# Patient Record
Sex: FEMALE | Race: WHITE | NOT HISPANIC OR LATINO | Employment: UNEMPLOYED | URBAN - NONMETROPOLITAN AREA
[De-identification: names, ages, dates, MRNs, and addresses within clinical notes are randomized per-mention and may not be internally consistent; named-entity substitution may affect disease eponyms.]

---

## 2017-08-18 ENCOUNTER — CONSULT (OUTPATIENT)
Dept: ONCOLOGY | Facility: CLINIC | Age: 27
End: 2017-08-18

## 2017-08-18 VITALS
WEIGHT: 103 LBS | HEIGHT: 61 IN | DIASTOLIC BLOOD PRESSURE: 57 MMHG | TEMPERATURE: 98.3 F | RESPIRATION RATE: 16 BRPM | HEART RATE: 98 BPM | SYSTOLIC BLOOD PRESSURE: 93 MMHG | BODY MASS INDEX: 19.45 KG/M2

## 2017-08-18 DIAGNOSIS — C50.811 MALIGNANT NEOPLASM OF OVERLAPPING SITES OF RIGHT FEMALE BREAST (HCC): Primary | ICD-10-CM

## 2017-08-18 PROBLEM — C50.819 OVERLAPPING MALIGNANT NEOPLASM OF FEMALE BREAST (HCC): Status: ACTIVE | Noted: 2017-08-18

## 2017-08-18 PROCEDURE — 99245 OFF/OP CONSLTJ NEW/EST HI 55: CPT | Performed by: INTERNAL MEDICINE

## 2017-08-18 RX ORDER — MORPHINE SULFATE 15 MG/1
1 TABLET ORAL
COMMUNITY
Start: 2017-07-28

## 2017-08-18 RX ORDER — DIPHENOXYLATE HYDROCHLORIDE AND ATROPINE SULFATE 2.5; .025 MG/1; MG/1
TABLET ORAL
Refills: 0 | COMMUNITY
Start: 2017-08-07

## 2017-08-18 RX ORDER — CYPROHEPTADINE HYDROCHLORIDE 4 MG/1
TABLET ORAL
COMMUNITY
Start: 2017-08-17

## 2017-08-18 RX ORDER — PROMETHAZINE HYDROCHLORIDE 12.5 MG/1
SUPPOSITORY RECTAL
COMMUNITY
Start: 2017-08-08

## 2017-08-18 RX ORDER — ONDANSETRON 4 MG/1
4 TABLET, FILM COATED ORAL EVERY 8 HOURS PRN
COMMUNITY

## 2017-08-18 RX ORDER — LORAZEPAM 0.5 MG/1
0.5 TABLET ORAL EVERY 8 HOURS PRN
COMMUNITY
End: 2017-10-17

## 2017-08-18 RX ORDER — OXYCODONE HCL 20 MG/1
TABLET, FILM COATED, EXTENDED RELEASE ORAL
COMMUNITY
Start: 2017-08-08 | End: 2017-11-07 | Stop reason: SDUPTHER

## 2017-08-18 RX ORDER — PROMETHAZINE HYDROCHLORIDE 12.5 MG/1
TABLET ORAL
COMMUNITY
Start: 2017-08-08

## 2017-08-18 RX ORDER — LEVOCETIRIZINE DIHYDROCHLORIDE 5 MG/1
TABLET, FILM COATED ORAL
COMMUNITY
Start: 2017-08-17

## 2017-08-18 RX ORDER — BUSPIRONE HYDROCHLORIDE 5 MG/1
TABLET ORAL
COMMUNITY
Start: 2017-07-07

## 2017-08-18 RX ORDER — LEVOFLOXACIN 500 MG/1
TABLET, FILM COATED ORAL
COMMUNITY
Start: 2017-08-17 | End: 2017-10-17

## 2017-08-18 RX ORDER — FENTANYL 25 UG/H
PATCH TRANSDERMAL
COMMUNITY
Start: 2017-07-27 | End: 2017-08-18

## 2017-08-18 NOTE — PROGRESS NOTES
CHIEF COMPLAINT: Breast cancer    REASON FOR REFERRAL: Breast cancer      RECORDS OBTAINED  Records of the patients history including those obtained from  Dr. Aparicio were reviewed and summarized in detail.      HISTORY OF PRESENT ILLNESS:  The patient is a 26 y.o.  female, referred for breast cancer.  She presented with a painful lump in her breast in November.  She has a strong family history all breast next panel was negative.  In January 2017 breast imaging showed a 2.7 cm mass at the 10 o'clock position in the right breast.  Ultrasound showed 3 positive axillary nodes with moderately differentiated infiltrating lobular carcinoma triple positive by fish 90% ER and NH positive and HER-2/susanne ratio of 2.15.  This was a T2 N1 M1 stage IV breast cancer at diagnosis with radiographic involvement of the right femur, acetabulum, lesser trochanter, and several hypodense liver lesions on initial CT.  She was started on THP March 2017 with Dr. Porter and had intramedullary nailing with Dr. Rojas April 2017 and was also diagnosed with a pulmonary embolus that month.  She then received radiation to her hip and subsequently has had radiation to her back and sternum with Dr. Aparicio.  MRI of her brain was negative.  Due to Xgeva she has developed hypocalcemia.  Despite 5 courses of THP, he had progression in her bones on recent CTs including bone scan and hence had the radiation and [place] and now has started second line therapies with a HER-2 targeted therapy that they're not sure what the drug name was as well as Lupron.    REVIEW OF SYSTEMS:  A 14 point review of systems was performed and is negative except as noted above.    No past medical history on file.  Past Surgical History:   Procedure Laterality Date   • LAPAROSCOPIC TUBAL LIGATION  2011       No current outpatient prescriptions on file prior to visit.     No current facility-administered medications on file prior to visit.        No Known  "Allergies    Social History     Social History   • Marital status: Unknown     Spouse name: N/A   • Number of children: N/A   • Years of education: N/A     Social History Main Topics   • Smoking status: Never Smoker   • Smokeless tobacco: None   • Alcohol use None   • Drug use: None   • Sexual activity: Not Asked     Other Topics Concern   • None     Social History Narrative   • None       Family History   Problem Relation Age of Onset   • Breast cancer Paternal Grandmother        PHYSICAL EXAM:    BP 93/57  Pulse 98  Temp 98.3 °F (36.8 °C)  Resp 16  Ht 61\" (154.9 cm)  Wt 103 lb (46.7 kg)  BMI 19.46 kg/m2    ECOG: (2) Ambulatory and capable of self care, unable to carry out work activity, up and about > 50% or waking hours  General: well appearing female in no acute distress  HEENT: sclera anicteric, oropharynx clear  Lymphatics: no cervical, supraclavicular, inguinal, or axillary adenopathy  Cardiovascular: regular rate and rhythm, no murmurs  Neck: Supple; No thyromegaly  Lungs: clear to auscultation bilaterally. No respiratory distress.   Abdomen: soft, nontender, nondistended.  No palpable organomegaly  Extremities: no cyanosis, clubbing, edema, or cords  Skin: no rashes, lesions, bruising, or petechiae  Neuro: Alert and oriented x 4; Moving all extremities.  Psych: No anxiety or depression    No results found for any previous visit.    Assessment/Plan     1. Stage IV T2 N1 M1 breast cancer metastatic to bone and possibly liver: 1 hour discussion regarding her tragic predicament.  I recommended to her the care of Dr. Jones.  I'm sure that they will be adding either Arimidex or tamoxifen though I suspect tamoxifen is unlikely to be the choice given her pulmonary embolus history.  Has had a hysterectomy at age 23.  Endometriosis but her ovaries are still intact.  What second line HER-2/susanne directed therapy is chosen is up for debate in this situation and I would not likely manage the choice " but there is more than one option and no absolute right answer.  I would not use samarium or other systemic radioisotopes for bone pain purposes as it is likely to cause protracted pancytopenia as that would make subsequent other therapies difficult I also reiterated to her the palliative nature of anything more 1 to do and the fact that one day will, she will likely need to put her foot down and due for further treatment if she feels the treatments are ineffective or too difficult for her.  Presently she is very weak and frail but not too frail for therapy and she is going to soldier on and I commended to her care she is receiving and she will return to the care.  I did go over the fact that we have clinical trials available for breast cancer but most of them are likely available at the [facility] as well.  I specifically do not have the checkpoint inhibitor trials open yet at our facility that may be coming available.  Unfortunately, in the breast cancer research world there have been pressure few molecular targets.  I also told her that generally with HER-2/susanne directed therapy we would not add Ibrance or everolimus to the hormone directed therapies as there are toxic and less effective in those settings.      Nicolas Hughes MD    8/18/2017

## 2017-10-17 ENCOUNTER — LAB (OUTPATIENT)
Dept: LAB | Facility: HOSPITAL | Age: 27
End: 2017-10-17

## 2017-10-17 ENCOUNTER — OFFICE VISIT (OUTPATIENT)
Dept: PALLIATIVE CARE | Facility: CLINIC | Age: 27
End: 2017-10-17

## 2017-10-17 VITALS
SYSTOLIC BLOOD PRESSURE: 111 MMHG | WEIGHT: 120 LBS | BODY MASS INDEX: 22.67 KG/M2 | DIASTOLIC BLOOD PRESSURE: 62 MMHG | HEART RATE: 90 BPM

## 2017-10-17 DIAGNOSIS — G47.01 INSOMNIA SECONDARY TO CHRONIC PAIN: ICD-10-CM

## 2017-10-17 DIAGNOSIS — G89.3 PAIN FROM BONE METASTASES (HCC): ICD-10-CM

## 2017-10-17 DIAGNOSIS — F32.9 REACTIVE DEPRESSION: ICD-10-CM

## 2017-10-17 DIAGNOSIS — R53.0 NEOPLASTIC MALIGNANT RELATED FATIGUE: ICD-10-CM

## 2017-10-17 DIAGNOSIS — K59.03 CONSTIPATION DUE TO OPIOID THERAPY: ICD-10-CM

## 2017-10-17 DIAGNOSIS — G89.29 INSOMNIA SECONDARY TO CHRONIC PAIN: ICD-10-CM

## 2017-10-17 DIAGNOSIS — R41.841 COGNITIVE COMMUNICATION DEFICIT: Primary | ICD-10-CM

## 2017-10-17 DIAGNOSIS — Z51.81 THERAPEUTIC DRUG MONITORING: ICD-10-CM

## 2017-10-17 DIAGNOSIS — T40.2X5A CONSTIPATION DUE TO OPIOID THERAPY: ICD-10-CM

## 2017-10-17 DIAGNOSIS — Z51.81 THERAPEUTIC DRUG MONITORING: Primary | ICD-10-CM

## 2017-10-17 DIAGNOSIS — C79.51 PAIN FROM BONE METASTASES (HCC): ICD-10-CM

## 2017-10-17 PROBLEM — R53.83 FATIGUE: Status: ACTIVE | Noted: 2017-10-17

## 2017-10-17 PROBLEM — Q61.5 CONGENITAL MEDULLARY SPONGE KIDNEY: Status: ACTIVE | Noted: 2017-10-17

## 2017-10-17 LAB
AMPHET+METHAMPHET UR QL: NEGATIVE
AMPHETAMINES UR QL: NEGATIVE
BARBITURATES UR QL SCN: NEGATIVE
BENZODIAZ UR QL SCN: NEGATIVE
BUPRENORPHINE SERPL-MCNC: NEGATIVE NG/ML
CANNABINOIDS SERPL QL: NEGATIVE
COCAINE UR QL: NEGATIVE
METHADONE UR QL SCN: NEGATIVE
OPIATES UR QL: POSITIVE
OXYCODONE UR QL SCN: POSITIVE
PCP UR QL SCN: NEGATIVE
PROPOXYPH UR QL: NEGATIVE
TRICYCLICS UR QL SCN: NEGATIVE

## 2017-10-17 PROCEDURE — 80306 DRUG TEST PRSMV INSTRMNT: CPT | Performed by: INTERNAL MEDICINE

## 2017-10-17 PROCEDURE — 99205 OFFICE O/P NEW HI 60 MIN: CPT | Performed by: INTERNAL MEDICINE

## 2017-10-17 RX ORDER — MIRTAZAPINE 15 MG/1
15 TABLET, FILM COATED ORAL NIGHTLY
Qty: 30 TABLET | Refills: 3 | Status: SHIPPED | OUTPATIENT
Start: 2017-10-17

## 2017-10-17 NOTE — PROGRESS NOTES
Subjective   Nessa Hammer is a 26 y.o. female.     History of Present Illness   26yowf with breast cancer diagnosed 1/2017 after painful lump R breast 11/2016.  Diagnosed with ER/NM positive T2 N1 M1 stage IV breast cancer metastatic to bones - R femur, acetabulum, lesser trochanter, and liver lesions.  S/p chemotherapy Dr. Porter and intramedullary nailing (Dr. Rojas) 4/2017.  S/p radiation to R hip.  Xgeva.  Has had progression despite treatment.  Referred by Dr. Porter for symptom management.  Co-morbid PE 4/2017 on Lovenox.    Noted intolerance to XRT in past with diarrhea and vomiting. eGFR >60 as of 9/20/17, Cr. 0.5.  Has medullary sponge kidney disease, so NSAIDs not recommended.      Pain: Continuous stabbing pain of R breast upon diagnosis.  None currently.  Past 2 weeks with continuous stabbing pain of L breast.  Feels breast is hot to touch.  No nipple discharge.  No discoloration.  Bilateral legs and low thoracic spine continuous stabbing, throbbing ache.  Also feels sensation of pulling calf muscles that radiates posterior thigh.  Back and leg pains worse with activity such a s cleaning house and making beds.  Better sometimes with warm bath    Medication management:  H/o somnolence with Fentanyl 25mcg and 12mcg.  H/o MSSR  30mg q12h with MSIR 15m PRN and mother though pain was best controlled then, but had urinary retention.  Rotated to Fentanyl (somnolence).  Start Oxycontin 20mg but c/o daytime sleepiness, so she only takes at bedtime.  She reports taking MSIR only up to once daily for pain.  Opioids locked in gun cabinet.  She keeps daily opioid on her in child safe pill bottle.    Symptoms: Constipation, BM ever other day with straining despite Senna.  Cognitive decline since IM nailing and initiation of opioid therapy.  Poor short term memory, stuttering and expressive difficulties with word finding.  No longer drives.  Other adult family members around her continuously between in-laws  who live with her and her parents.  Poor sleep (about 5 hours) with episodes of night awakening due to pain.  Sleep also impacted by  awakening at 4 am to go to work.  Overall fatigue.  Depression     Function/Activities: Independent of ADLs.  Requires transportation due to cognitive changes.  She walked 1 mile this past weekend for jumana event.      Mood/Support/Distress: Large support system.  .  Elementary school age children.  Pt and her parents seen together with LCSW today.    ACP/Goals: Pain control and function.  Did not address advance care planning at initial appointment.  Noted patient is .  Her  was not present today.      The following portions of the patient's history were reviewed and updated as appropriate: allergies, current medications, past family history, past medical history, past social history, past surgical history and problem list.    Review of Systems  Otherwise negative except as below and as already detailed in HPI.    MICHI:  Reviewed.  See flowsheets and scanned forms. No concerns.  Consistent with history.  Prescribers identified as members of care team.     Medication Counts:  Reviewed.  See flowsheets and scanned forms. Brought medication.  No overuse or misuse evident.  Noted she had a large full bottle of #180 tabs MSIR 15mg as well as another small bottle, almost 1/3 full of MSIR 15mg tabs.  #21 tabs Oxycontin 20mg tabs.  She did not bring Fentanyl patches, but had filled #10 on 9/25/17.      Opioid Risk Tool:  Low Risk    UDS:  THC, Screen, Urine   Date Value Ref Range Status   10/17/2017 Negative Negative Final     Phencyclidine (PCP), Urine   Date Value Ref Range Status   10/17/2017 Negative Negative Final     Cocaine Screen, Urine   Date Value Ref Range Status   10/17/2017 Negative Negative Final     Methamphetamine, Urine   Date Value Ref Range Status   10/17/2017 Negative Negative Final     Opiate Screen   Date Value Ref Range Status    10/17/2017 Positive (A) Negative Final     Amphetamine Screen, Urine   Date Value Ref Range Status   10/17/2017 Negative Negative Final     Benzodiazepine Screen, Urine   Date Value Ref Range Status   10/17/2017 Negative Negative Final     Tricyclic Antidepressants Screen   Date Value Ref Range Status   10/17/2017 Negative Negative Final     Methadone Screen, Urine   Date Value Ref Range Status   10/17/2017 Negative Negative Final     Barbiturates Screen, Urine   Date Value Ref Range Status   10/17/2017 Negative Negative Final     Oxycodone Screen, Urine   Date Value Ref Range Status   10/17/2017 Positive (A) Negative Final     Propoxyphene Screen   Date Value Ref Range Status   10/17/2017 Negative Negative Final     Buprenorphine, Screen, Urine   Date Value Ref Range Status   10/17/2017 Negative Negative Final     Palliative Performance Scale  Palliative Performance Scale Score: 80%    [place] Symptom Assessment System Revised  Pain Score: 6  Tiredness Score: 6  Nausea Score: No nausea  Depression Score: 5  Anxiety Score: 5  Drowsiness Score: No drowsiness  Lack of Appetite Score: 5  Wellbeing Score: 5  Dyspnea Score: No shortness of breath    SATYA-7:    Over the last two weeks, how often have you been bothered by the following problems?  Feeling nervous, anxious or on edge: More than half the days  Not being able to stop or control worrying: Several days  Worrying too much about different things: Several days  Trouble Relaxing: Several days  Being so restless that it is hard to sit still: Not at all  Becoming easily annoyed or irritable: Several days  Feeling afraid as if something awful might happen: Several days  SATYA 7 Total Score: 7    PHQ-9:  PHQ-2/PHQ-9 Depression Screening 10/17/2017   Little interest or pleasure in doing things 1   Feeling down, depressed, or hopeless 1   Trouble falling or staying asleep, or sleeping too much 1   Feeling tired or having little energy 2   Poor appetite or overeating 1    Feeling bad about yourself - or that you are a failure or have let yourself or your family down 0   Trouble concentrating on things, such as reading the newspaper or watching television 0   Moving or speaking so slowly that other people could have noticed. Or the opposite - being so fidgety or restless that you have been moving around a lot more than usual 1   Thoughts that you would be better off dead, or of hurting yourself in some way 0   Total Score 7        KPS: 80 - Normal activity with effort; some signs or symptoms of disease    ECOG: (1) Restricted in physically strenuous activity, ambulatory and able to do work of light nature    Objective   Physical Exam   Constitutional: She is oriented to person, place, and time. She appears well-developed and well-nourished. No distress.   Eyes: Conjunctivae are normal. Right eye exhibits no discharge. Left eye exhibits no discharge. No scleral icterus.   Cardiovascular: Normal rate, regular rhythm, normal heart sounds and intact distal pulses.  Exam reveals no gallop.    No murmur heard.  Pulmonary/Chest: Effort normal and breath sounds normal. No stridor. No respiratory distress. She has no wheezes. She has no rales.   Abdominal: Soft. Bowel sounds are normal. She exhibits no distension. There is no tenderness.   Musculoskeletal: Normal range of motion. She exhibits tenderness. She exhibits no edema.        Thoracic back: She exhibits bony tenderness.        Lumbar back: She exhibits pain and spasm. She exhibits no bony tenderness.   R lumbar paraspinal tension compared to R   Neurological: She is alert and oriented to person, place, and time. She exhibits normal muscle tone. Coordination abnormal.   Skin: Skin is warm. No rash noted. She is not diaphoretic. No erythema. No pallor.   Psychiatric: Her speech is normal and behavior is normal. Judgment and thought content normal. Her mood appears anxious. Cognition and memory are normal. She is attentive.   Vitals  reviewed.        No results found for: NA, K, CL, CO2, BUN, CREATININE, LABGLOM, GLUCOSE, CALCIUM    Assessment/Plan   Nessa was seen today for pain, fatigue, depression, anxiety and poor appetite.    Diagnoses and all orders for this visit:    Cognitive communication deficit    Pain from bone metastases    Constipation due to opioid therapy    Reactive depression    Neoplastic malignant related fatigue    Insomnia secondary to chronic pain    Other orders  -     mirtazapine (REMERON) 15 MG tablet; Take 1 tablet by mouth Every Night.    Patient instructions:  1.  American Ginseng 500 mg twice daily for fatigue  2.  Brisk walking 30 minutes every day to combat fatigue  3.  SalonPas Lidocaine 4% patches to back every night  4.  Warmed up apple juice with large tablespoon of brown sugar and Miralax for constipation  5.  Relistor injection every 2-3 days as needed for constipation or urine retention.  We can call this in if you need it.  6.  Morphine tablets - may take one-half to one tablet every 2 hours as needed for pain  7.  Daily yoga stretching - modified sun salutation every day  8.  Instead of Oxycontin 20mg at bedtime, take 2 tabs of Morphine 15mg at bedtime.  May take 1/2 to 1 tablet once in middle of night if you wake up in pain.      Call palliative RN [phone] Mon-Fri 9am-4pm to discuss unmanaged symptoms or questions about local resources.      Discussion:  Lengthy discussion today with patient and her parents.  Discussed her bone pain.  She is barely opioid tolerant as she often does not take her MSIR.  Discussed that Oxycontin 20mg is equivalent to 30mg morphine, so not necessary to refill at this time.  Discussed peak effect (1 hr for short acting) and half-life of long acting (about 8hrs) vs short acting (about 3hrs).  Discussed that it seems the MSIR dose is effective, but she is not clearly developing tolerance to sedation as she has decided not to take opioid.  Encouraged her that opioid  "sparing is ultimately a great way to manage pain, if she can still be functional, that reshaping \"discomfort\" vs. \"pain\" is appropriate.  Physical dependence and opioid hyperalgesia does occur with chronic opioid therapy.  However, if she is limited functionally due to pain and non-opioid strategies are not effective, then she can and probably should dose MSIR more often.  Due to sedation effect, she can cut MSIR 15mg tabs in half.  She can also take APAP during daytime, as already suggested by Dr. Porter.    Discussed secondary MSK pain, due to compensatory positioning and movement.  Encouraged Yoga to prevent this.  Noted Arimidex may be contributing to MSK pain. We discussed turmeric, which is helpful for MSK pain, especially arthralgia.    Re: fatigue (likely Arimidex), discussed Ginseng.  Discussed 30 minutes daily brisk walking.  Discussed good sleep hygiene.    Can start Remeron to aide in sleep and reactive depression.  Although, her PHQ-9 score is only mild depression.  Her cognitive impairments are likely from Arimidex and opioid, and chemotherapy.  SSRI could help chemo-related cognitive deficit.  No reports of decreased Arimidex efficacy, but there is decreased Tamoxifen efficacy, so will try Mirtazapine first.    Follow up in 1 month.           Note that patient did NOT sign Controlled Substance Agreement today.  She will discuss with PCP re: prescribing, if my recommendations will support PCP prescribing.  She voices concern of traveling to [place] for follow up.      If she decides she wants me to assume controlled substance prescribing responsibility, then she will need to have mailed an agreement and mail back to our office so that we can fill that role in her medical treatment team.  "

## 2017-10-17 NOTE — PROGRESS NOTES
Pt and her parents seen with Dr. Desai. Explained role of palliative care, symptom management and support. Pt has significant pain in her bones, back, legs and now pain in her other breast. She has alerted her oncologist to this. Pt and family share history of start of  pain in November of last year and seeking out answers but not diagnosed until February of this year. Pt is appropriately tearful describing that she was able to do a lot with her kids prior to all this.  Explored supports. Pt is  and has two children ages 6 and 9. Spouse goes to work early in the am and rest of large family comes and stays with the pt as she has been having some confusion, memory issues either medication related or chemo. Goal is pain management with function.

## 2017-10-17 NOTE — PATIENT INSTRUCTIONS
1.  American Ginseng 500 mg twice daily for fatigue  2.  Brisk walking 30 minutes every day to combat fatigue  3.  SalonPas Lidocaine 4% patches to back every night  4.  Warmed up apple juice with large tablespoon of brown sugar and Miralax for constipation  5.  Relistor injection every 2-3 days as needed for constipation or urine retention.  We can call this in if you need it.  6.  Morphine tablets - may take one-half to one tablet every 2 hours as needed for pain  7.  Daily yoga stretching - modified sun salutation every day  8.  Instead of Oxycontin 20mg at bedtime, take 2 tabs of Morphine 15mg at bedtime.  May take 1/2 to 1 tablet once in middle of night if you wake up in pain.      Call palliative RN [phone] Mon-Fri 9am-4pm to discuss unmanaged symptoms or questions about local resources.

## 2017-11-07 ENCOUNTER — OFFICE VISIT (OUTPATIENT)
Dept: PALLIATIVE CARE | Facility: CLINIC | Age: 27
End: 2017-11-07

## 2017-11-07 VITALS
WEIGHT: 117 LBS | DIASTOLIC BLOOD PRESSURE: 64 MMHG | HEART RATE: 68 BPM | BODY MASS INDEX: 22.11 KG/M2 | SYSTOLIC BLOOD PRESSURE: 105 MMHG

## 2017-11-07 DIAGNOSIS — G89.3 PAIN FROM BONE METASTASES (HCC): Primary | ICD-10-CM

## 2017-11-07 DIAGNOSIS — C79.51 PAIN FROM BONE METASTASES (HCC): Primary | ICD-10-CM

## 2017-11-07 PROCEDURE — 99214 OFFICE O/P EST MOD 30 MIN: CPT | Performed by: INTERNAL MEDICINE

## 2017-11-07 RX ORDER — OXYCODONE HCL 20 MG/1
20 TABLET, FILM COATED, EXTENDED RELEASE ORAL EVERY 12 HOURS
Qty: 42 TABLET | Refills: 0 | Status: SHIPPED | OUTPATIENT
Start: 2017-11-07 | End: 2017-11-28

## 2017-11-07 NOTE — PATIENT INSTRUCTIONS
1.  Start Remeron for depression and appetite and sleep  2.  Continue Buspar for anxiety  3.  Oxycontin 20mg, one tablet at 8am and one tablet at 8pm  4.  Use morphine only if needed for severe pain, one-half to one tablet every 2 hours as needed.  5.  Ginger for nausea

## 2017-11-07 NOTE — PROGRESS NOTES
Visit prior to Dr. Desai. Pt is with her mother and her aunt today. She had been started on a dilaudid pump but felt this was killing her, her heart was racing and she thought something was wrong. This was removed and she would not want this again unless it were necessary. Checked on pt's mood. She says this varies. Mom thinks this is due to hysterectomy and menopause. Dr. Desai somes in and shares that there is a doctor in [place] that could continue management of pt's pain medication. This would make it easier on the pt and family due to long drive to get here.

## 2017-11-07 NOTE — PROGRESS NOTES
"Subjective   Nessa Hammer is a 26 y.o. female.     History of Present Illness   26yowf with breast cancer diagnosed 1/2017 after painful lump R breast 11/2016.  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  S/p chemotherapy Dr. Porter and intramedullary nailing (Dr. Rojas) 4/2017.  S/p radiation to R hip.  Xgeva.  Has had progression despite treatment.  Referred by Dr. Porter for symptom management.  Co-morbid PE 4/2017 on Lovenox.     Noted intolerance to XRT in past with diarrhea and vomiting. eGFR >60 as of 9/20/17, Cr. 0.5.  Has medullary sponge kidney disease, so NSAIDs not recommended.      Interim history:  Bilateral leg and low thoracic spine continuous stabbing, throbbing ache was managed with Oxycontin 20mg at bedtime and MSIR 15mg upon awakening (her regimen when I saw her 3 weeks ago.  Was independent of light housework with this regimen and w/o somnolence.  Ginseng not helpful for energy level.  SalonPas to back was helpful.      Acute pain crises requiring MSIR 15mg every 2 hours, which led to constipation and her sleeping more.  Seen by Dr. Sol.  Found to have new metastatic disease of bony T- and L- spine.  Got radiation.  Was treated also for dehydration and fecal impaction.  Seen by Pain Management, was started on Dilaudid PCA.  I spoke with Dr. Vela.  She was on Dilaudid 0.3mg/hr and no use of bolus.  Stopped oral opioids.  When she went home, she feels that new bag caused \"bottom out\" and describes racing heart.  That was stopped.  She resumed Oxycontin and MSIR dosing regimen as before.  Occasionally takes extra MSIR 15mg midday.    I reviewed [facility] records and spoke with Dr. Vela.      Denies constipation with Miralax.  No sedation/somnolence issues.  Sleeps from 8pm to 6am without night awakening due to pain.  Anxiety in hospital, responded to Ativan.  " Intermittent nausea at times, better after Ativan.    She sees Dr. Porter later today to discuss cancer treatment options.    The following portions of the patient's history were reviewed and updated as appropriate: allergies, current medications, past family history, past medical history, past social history, past surgical history and problem list.    Review of Systems  Otherwise negative except as below and as already detailed in HPI.    MICHI:  Reviewed.  See flowsheets and scanned forms. No concerns.  Consistent with history.  Prescribers identified as members of care team. #6 tabs Oxycontin 20mg.  Almost full bottle MSIR 15mg tabs, filled 9/25/17.    Medication Counts:  Reviewed.  See flowsheets and scanned forms. Brought medication.  No overuse or misuse evident.    Opioid Risk Tool:  Low Risk    UDS:  THC, Screen, Urine   Date Value Ref Range Status   10/17/2017 Negative Negative Final     Phencyclidine (PCP), Urine   Date Value Ref Range Status   10/17/2017 Negative Negative Final     Cocaine Screen, Urine   Date Value Ref Range Status   10/17/2017 Negative Negative Final     Methamphetamine, Urine   Date Value Ref Range Status   10/17/2017 Negative Negative Final     Opiate Screen   Date Value Ref Range Status   10/17/2017 Positive (A) Negative Final     Amphetamine Screen, Urine   Date Value Ref Range Status   10/17/2017 Negative Negative Final     Benzodiazepine Screen, Urine   Date Value Ref Range Status   10/17/2017 Negative Negative Final     Tricyclic Antidepressants Screen   Date Value Ref Range Status   10/17/2017 Negative Negative Final     Methadone Screen, Urine   Date Value Ref Range Status   10/17/2017 Negative Negative Final     Barbiturates Screen, Urine   Date Value Ref Range Status   10/17/2017 Negative Negative Final     Oxycodone Screen, Urine   Date Value Ref Range Status   10/17/2017 Positive (A) Negative Final     Propoxyphene Screen   Date Value Ref Range Status   10/17/2017  Negative Negative Final     Buprenorphine, Screen, Urine   Date Value Ref Range Status   10/17/2017 Negative Negative Final     Palliative Performance Scale  Palliative Performance Scale Score: 60%    [place] Symptom Assessment System Revised  Pain Score: 2  Tiredness Score: 4  Nausea Score: No nausea  Depression Score: No depression  Anxiety Score: No anxiety  Drowsiness Score: No drowsiness  Lack of Appetite Score: 5  Wellbeing Score: 2  Dyspnea Score: No shortness of breath  Other Problem Score: Best possible response  Source of Information: patient    SATYA-7:    Over the last two weeks, how often have you been bothered by the following problems?  Feeling nervous, anxious or on edge: Not at all  Not being able to stop or control worrying: Not at all  Worrying too much about different things: Not at all  Trouble Relaxing: Not at all  Being so restless that it is hard to sit still: Not at all  Becoming easily annoyed or irritable: Not at all  Feeling afraid as if something awful might happen: Not at all  SATYA 7 Total Score: 0    PHQ-9:  PHQ-2/PHQ-9 Depression Screening 11/7/2017   Little interest or pleasure in doing things 1   Feeling down, depressed, or hopeless 1   Trouble falling or staying asleep, or sleeping too much 1   Feeling tired or having little energy 1   Poor appetite or overeating 1   Feeling bad about yourself - or that you are a failure or have let yourself or your family down 1   Trouble concentrating on things, such as reading the newspaper or watching television 1   Moving or speaking so slowly that other people could have noticed. Or the opposite - being so fidgety or restless that you have been moving around a lot more than usual 1   Thoughts that you would be better off dead, or of hurting yourself in some way 0   Total Score 8        KPS: 60 - Requires occasional assistance, but is able care for most of personal needs    ECOG: (2) Ambulatory and capable of self care, unable to carry out work  activity, up and about > 50% or waking hours    Objective   Physical Exam   Constitutional: She is oriented to person, place, and time. She appears well-developed and well-nourished. No distress.   HENT:   Mouth/Throat: No oropharyngeal exudate.   Eyes: EOM are normal. Right eye exhibits no discharge. Left eye exhibits no discharge. No scleral icterus.   Cardiovascular: Normal rate, regular rhythm and normal heart sounds.  Exam reveals no gallop and no friction rub.    No murmur heard.  Pulmonary/Chest: Effort normal and breath sounds normal. No stridor. No respiratory distress. She has no wheezes. She has no rales.   Abdominal: Soft. Bowel sounds are normal. She exhibits no distension. There is no tenderness.   Musculoskeletal: She exhibits tenderness. She exhibits no edema or deformity.        Right shoulder: She exhibits bony tenderness.        Thoracic back: She exhibits bony tenderness. She exhibits no spasm.   Neurological: She is alert and oriented to person, place, and time. She exhibits normal muscle tone. Coordination normal.   Skin: Skin is warm and dry. No rash noted. She is not diaphoretic. No erythema. No pallor.   Psychiatric: She has a normal mood and affect. Her behavior is normal. Judgment and thought content normal.   Vitals reviewed.        No results found for: NA, K, CL, CO2, BUN, CREATININE, LABGLOM, GLUCOSE, CALCIUM    Assessment/Plan   Nessa was seen today for pain, fatigue and poor appetite.    Diagnoses and all orders for this visit:    Pain from bone metastases  -     oxyCODONE ER (oxyCONTIN) 20 MG 12 hr tablet; Take 1 tablet by mouth Every 12 (Twelve) Hours for 21 days. At 8am and 8pm.      Patient was counseled on narcotic safety and patient responsibility of medication against theft or stolen medications.  Patient was counseled to take medications only as prescribed or instructed by prescribing physician.  Patient was counseled regarding risk of overdose and polypharmacy.  Patient  was advised against using alcohol or driving while on narcotic medication.  Patient was advised of opioid side effects of constipation and potential altered sensorium.  Advised of long term effects of sleep apnea, hypogonadism and fatigue, osteoporosis, depression, hyperalgesia and risk of abuse and addiction.  Advised to be supervised for first few days while on new medication or dosages.    Discussion:  Advised of risk of death from respiratory depression with benzodiazepine and opioid combination.  Advised to start Remeron for mood.  Continue Buspar for anxiety.  Use Ativan sparingly.  Will schedule Oxycontin for more streamlined opioid therapy.    Discussed distance of travel to patient.  I discussed her with Dr. Vela.  He will accept her as patient to provide pain management.      I briefly discussed with her and her mother, home hospice as option if no further cancer treatment options offered, and if increase in pain and symptom burden.  Local medical team in [place] can refer when appropriate.    Patient instructions:  1.  Start Remeron for depression and appetite and sleep  2.  Continue Buspar for anxiety  3.  Oxycontin 20mg, one tablet at 8am and one tablet at 8pm  4.  Use morphine only if needed for severe pain, one-half to one tablet every 2 hours as needed.  5.  Ginger for nausea    No palliative clinic follow up.  She will transfer care to [facility].